# Patient Record
Sex: FEMALE | Race: WHITE | NOT HISPANIC OR LATINO | Employment: OTHER | URBAN - METROPOLITAN AREA
[De-identification: names, ages, dates, MRNs, and addresses within clinical notes are randomized per-mention and may not be internally consistent; named-entity substitution may affect disease eponyms.]

---

## 2021-06-27 ENCOUNTER — APPOINTMENT (OUTPATIENT)
Dept: GENERAL RADIOLOGY | Facility: CLINIC | Age: 81
End: 2021-06-27
Attending: NURSE PRACTITIONER
Payer: MEDICARE

## 2021-06-27 ENCOUNTER — HOSPITAL ENCOUNTER (EMERGENCY)
Facility: CLINIC | Age: 81
Discharge: HOME | End: 2021-06-27
Attending: NURSE PRACTITIONER | Admitting: NURSE PRACTITIONER
Payer: MEDICARE

## 2021-06-27 ENCOUNTER — APPOINTMENT (OUTPATIENT)
Dept: CT IMAGING | Facility: CLINIC | Age: 81
End: 2021-06-27
Attending: NURSE PRACTITIONER
Payer: MEDICARE

## 2021-06-27 VITALS
RESPIRATION RATE: 20 BRPM | WEIGHT: 245 LBS | DIASTOLIC BLOOD PRESSURE: 81 MMHG | BODY MASS INDEX: 34.66 KG/M2 | TEMPERATURE: 98.1 F | SYSTOLIC BLOOD PRESSURE: 174 MMHG | OXYGEN SATURATION: 94 % | HEART RATE: 89 BPM

## 2021-06-27 DIAGNOSIS — R53.83 FATIGUE: ICD-10-CM

## 2021-06-27 LAB
ALBUMIN SERPL-MCNC: 3.8 G/DL (ref 3.4–5)
ALBUMIN UR-MCNC: NEGATIVE MG/DL
ALP SERPL-CCNC: 94 U/L (ref 40–150)
ALT SERPL W P-5'-P-CCNC: 28 U/L (ref 0–50)
ANION GAP SERPL CALCULATED.3IONS-SCNC: 4 MMOL/L (ref 3–14)
APPEARANCE UR: CLEAR
AST SERPL W P-5'-P-CCNC: 19 U/L (ref 0–45)
BASOPHILS # BLD AUTO: 0 10E9/L (ref 0–0.2)
BASOPHILS NFR BLD AUTO: 0.5 %
BILIRUB SERPL-MCNC: 0.8 MG/DL (ref 0.2–1.3)
BILIRUB UR QL STRIP: NEGATIVE
BUN SERPL-MCNC: 17 MG/DL (ref 7–30)
CALCIUM SERPL-MCNC: 8.9 MG/DL (ref 8.5–10.1)
CHLORIDE SERPL-SCNC: 106 MMOL/L (ref 94–109)
CO2 SERPL-SCNC: 29 MMOL/L (ref 20–32)
COLOR UR AUTO: NORMAL
CREAT SERPL-MCNC: 0.87 MG/DL (ref 0.52–1.04)
DIFFERENTIAL METHOD BLD: ABNORMAL
EOSINOPHIL # BLD AUTO: 0.1 10E9/L (ref 0–0.7)
EOSINOPHIL NFR BLD AUTO: 1.5 %
ERYTHROCYTE [DISTWIDTH] IN BLOOD BY AUTOMATED COUNT: 13 % (ref 10–15)
GFR SERPL CREATININE-BSD FRML MDRD: 63 ML/MIN/{1.73_M2}
GLUCOSE BLDC GLUCOMTR-MCNC: 166 MG/DL (ref 70–99)
GLUCOSE SERPL-MCNC: 164 MG/DL (ref 70–99)
GLUCOSE UR STRIP-MCNC: NEGATIVE MG/DL
HCT VFR BLD AUTO: 40.6 % (ref 35–47)
HGB BLD-MCNC: 13.6 G/DL (ref 11.7–15.7)
HGB UR QL STRIP: NEGATIVE
IMM GRANULOCYTES # BLD: 0 10E9/L (ref 0–0.4)
IMM GRANULOCYTES NFR BLD: 0.3 %
KETONES UR STRIP-MCNC: NEGATIVE MG/DL
LACTATE BLD-SCNC: 0.8 MMOL/L (ref 0.7–2)
LEUKOCYTE ESTERASE UR QL STRIP: NEGATIVE
LYMPHOCYTES # BLD AUTO: 0.7 10E9/L (ref 0.8–5.3)
LYMPHOCYTES NFR BLD AUTO: 11.8 %
MAGNESIUM SERPL-MCNC: 1.9 MG/DL (ref 1.6–2.3)
MCH RBC QN AUTO: 30.6 PG (ref 26.5–33)
MCHC RBC AUTO-ENTMCNC: 33.5 G/DL (ref 31.5–36.5)
MCV RBC AUTO: 91 FL (ref 78–100)
MONOCYTES # BLD AUTO: 0.5 10E9/L (ref 0–1.3)
MONOCYTES NFR BLD AUTO: 8 %
NEUTROPHILS # BLD AUTO: 4.7 10E9/L (ref 1.6–8.3)
NEUTROPHILS NFR BLD AUTO: 77.9 %
NITRATE UR QL: NEGATIVE
NRBC # BLD AUTO: 0 10*3/UL
NRBC BLD AUTO-RTO: 0 /100
PH UR STRIP: 7 PH (ref 5–7)
PLATELET # BLD AUTO: 193 10E9/L (ref 150–450)
POTASSIUM SERPL-SCNC: 3.8 MMOL/L (ref 3.4–5.3)
PROT SERPL-MCNC: 7.4 G/DL (ref 6.8–8.8)
RBC # BLD AUTO: 4.45 10E12/L (ref 3.8–5.2)
SODIUM SERPL-SCNC: 139 MMOL/L (ref 133–144)
SOURCE: NORMAL
SP GR UR STRIP: 1 (ref 1–1.03)
TROPONIN I SERPL-MCNC: <0.015 UG/L (ref 0–0.04)
TROPONIN I SERPL-MCNC: <0.015 UG/L (ref 0–0.04)
UROBILINOGEN UR STRIP-MCNC: 0 MG/DL (ref 0–2)
WBC # BLD AUTO: 6 10E9/L (ref 4–11)

## 2021-06-27 PROCEDURE — 83605 ASSAY OF LACTIC ACID: CPT | Performed by: NURSE PRACTITIONER

## 2021-06-27 PROCEDURE — 70450 CT HEAD/BRAIN W/O DYE: CPT | Mod: ME

## 2021-06-27 PROCEDURE — 71045 X-RAY EXAM CHEST 1 VIEW: CPT

## 2021-06-27 PROCEDURE — 84484 ASSAY OF TROPONIN QUANT: CPT | Mod: 91 | Performed by: NURSE PRACTITIONER

## 2021-06-27 PROCEDURE — 96360 HYDRATION IV INFUSION INIT: CPT | Performed by: NURSE PRACTITIONER

## 2021-06-27 PROCEDURE — 99285 EMERGENCY DEPT VISIT HI MDM: CPT | Mod: 25 | Performed by: NURSE PRACTITIONER

## 2021-06-27 PROCEDURE — 85025 COMPLETE CBC W/AUTO DIFF WBC: CPT | Performed by: NURSE PRACTITIONER

## 2021-06-27 PROCEDURE — 258N000003 HC RX IP 258 OP 636: Performed by: NURSE PRACTITIONER

## 2021-06-27 PROCEDURE — 80053 COMPREHEN METABOLIC PANEL: CPT | Performed by: NURSE PRACTITIONER

## 2021-06-27 PROCEDURE — 83735 ASSAY OF MAGNESIUM: CPT | Performed by: NURSE PRACTITIONER

## 2021-06-27 PROCEDURE — 93005 ELECTROCARDIOGRAM TRACING: CPT | Performed by: NURSE PRACTITIONER

## 2021-06-27 PROCEDURE — 93010 ELECTROCARDIOGRAM REPORT: CPT | Performed by: NURSE PRACTITIONER

## 2021-06-27 PROCEDURE — 87040 BLOOD CULTURE FOR BACTERIA: CPT | Performed by: NURSE PRACTITIONER

## 2021-06-27 PROCEDURE — 999N001017 HC STATISTIC GLUCOSE BY METER IP

## 2021-06-27 PROCEDURE — 81003 URINALYSIS AUTO W/O SCOPE: CPT | Performed by: NURSE PRACTITIONER

## 2021-06-27 PROCEDURE — 250N000013 HC RX MED GY IP 250 OP 250 PS 637: Performed by: NURSE PRACTITIONER

## 2021-06-27 RX ORDER — SODIUM CHLORIDE 9 MG/ML
INJECTION, SOLUTION INTRAVENOUS CONTINUOUS
Status: DISCONTINUED | OUTPATIENT
Start: 2021-06-27 | End: 2021-06-27 | Stop reason: HOSPADM

## 2021-06-27 RX ORDER — BUPROPION HYDROCHLORIDE 100 MG/1
100 TABLET, EXTENDED RELEASE ORAL 2 TIMES DAILY
Qty: 30 TABLET | Refills: 0 | Status: SHIPPED | OUTPATIENT
Start: 2021-06-27

## 2021-06-27 RX ORDER — PAROXETINE 40 MG/1
40 TABLET, FILM COATED ORAL EVERY MORNING
Qty: 30 TABLET | Refills: 0 | Status: SHIPPED | OUTPATIENT
Start: 2021-06-27

## 2021-06-27 RX ORDER — OXYCODONE AND ACETAMINOPHEN 5; 325 MG/1; MG/1
1 TABLET ORAL ONCE
Status: COMPLETED | OUTPATIENT
Start: 2021-06-27 | End: 2021-06-27

## 2021-06-27 RX ADMIN — SODIUM CHLORIDE 1000 ML: 9 INJECTION, SOLUTION INTRAVENOUS at 17:56

## 2021-06-27 RX ADMIN — OXYCODONE HYDROCHLORIDE AND ACETAMINOPHEN 1 TABLET: 5; 325 TABLET ORAL at 19:28

## 2021-06-27 ASSESSMENT — ENCOUNTER SYMPTOMS
COUGH: 0
CARDIOVASCULAR NEGATIVE: 1
ABDOMINAL DISTENTION: 0
DIZZINESS: 0
SHORTNESS OF BREATH: 0
TROUBLE SWALLOWING: 0
CHEST TIGHTNESS: 0
DIAPHORESIS: 0
EYE REDNESS: 0
FATIGUE: 1
SLEEP DISTURBANCE: 0
LIGHT-HEADEDNESS: 0
BRUISES/BLEEDS EASILY: 1
ARTHRALGIAS: 1

## 2021-06-27 NOTE — ED TRIAGE NOTES
"Pt stated \"I woke up this morning and didn't feel well. I have been feeling more tired and weak. I also had high blood sugar this morning (170s).\"  "

## 2021-06-27 NOTE — ED PROVIDER NOTES
"Triage Note  1622 Pt stated \"I woke up this morning and didn't feel well. I have been feeling more tired and weak. I also had high blood sugar this morning (170s).\"       History     Chief Complaint   Patient presents with     Generalized Weakness     Hyperglycemia     HPI  Bianca Pandey is a 80 year old female who is visiting her brother from [place] with history of breast cancer whom completed her course of tamoxifen, atrial fib on Xarelto and metoprolol, history of pulmonary emboli, history of hypertension and hyperglycemia on insulin presenting to the emergency department with generalized malaise or fatigue, chills, suprapubic pressure onset today.  She reports they have been doing a lot of activities over the past week and generally if she is extremely active she needs to take 1 day of rest and she can sleep for about 12 hours.  She reports when she awoke this morning she did not feel well and such went back to bed and awoke at 3 PM today and when she awoke she just felt chilled, weak, and not well.  She reports she generally feels this way when she has a urinary tract infection.    She reports her blood sugar this morning was 170 which is above her normal.  She denies nausea, vomiting, diarrhea, severe abdominal pain, no flank pain, no hematuria, no strokelike symptoms such as slurred speech, facial droop, extremity weakness, paresthesias.    PCP; No Ref-Primary, Physician     Allergies:  Allergies   Allergen Reactions     Benadryl Allergy      Morphine Sulfate      Versed        Problem List:    Patient Active Problem List    Diagnosis Date Noted     Pulmonary embolism on right (H) 07/16/2012     Priority: Medium     Back pain 07/16/2012     Priority: Medium     Nausea 07/16/2012     Priority: Medium     AF (paroxysmal atrial fibrillation) (H) 07/16/2012     Priority: Medium     Breast cancer (H) 07/16/2012     Priority: Medium        Past Medical History:    Past Medical History:   Diagnosis Date     " Pulmonary embolism (H) 07/04/12       Past Surgical History:    History reviewed. No pertinent surgical history.    Family History:    No family history on file.    Social History:  Marital Status:   [2]  Social History     Tobacco Use     Smoking status: Former Smoker   Substance Use Topics     Alcohol use: None     Drug use: None        Medications:    buPROPion (WELLBUTRIN SR) 100 MG 12 hr tablet  PARoxetine (PAXIL) 40 MG tablet  rivaroxaban ANTICOAGULANT (XARELTO ANTICOAGULANT) 20 MG TABS tablet  Amlodipine-Valsartan-HCTZ (EXFORGE HCT) 5-160-25 MG TABS  levothyroxine (SYNTHROID, LEVOTHROID) 150 MCG tablet  metFORMIN HCl 1000 MG (OSM) 24 hr tablet  metoprolol (LOPRESSOR) 50 MG tablet  omeprazole (PRILOSEC) 40 MG capsule  ondansetron (ZOFRAN) 4 MG tablet  oxyCODONE-acetaminophen (PERCOCET) 5-325 MG per tablet  tamoxifen (NOLVADEX) 20 MG tablet          Review of Systems   Constitutional: Positive for fatigue. Negative for diaphoresis.   HENT: Positive for congestion. Negative for trouble swallowing.         Seasonal allergies   Eyes: Negative for redness and visual disturbance.   Respiratory: Negative for cough, chest tightness and shortness of breath.    Cardiovascular: Negative.    Gastrointestinal: Negative for abdominal distention.   Endocrine: Positive for cold intolerance.   Musculoskeletal: Positive for arthralgias.   Skin: Negative for pallor.   Allergic/Immunologic: Positive for immunocompromised state.        Diabetic and age   Neurological: Negative for dizziness, syncope and light-headedness.   Hematological: Bruises/bleeds easily.   Psychiatric/Behavioral: Negative for sleep disturbance.       Physical Exam   BP: 136/78  Pulse: 70  Temp: 98.1  F (36.7  C)  Resp: 18  Weight: 111.1 kg (245 lb)  SpO2: 98 %      Physical Exam  Vitals signs and nursing note reviewed.   Constitutional:       Appearance: Normal appearance.      Comments: .  She does not feel well but is not toxic in appearance   HENT:       Head: Normocephalic and atraumatic.      Jaw: There is normal jaw occlusion.      Nose: Nose normal.      Mouth/Throat:      Lips: Pink.      Mouth: Mucous membranes are dry.      Tongue: Tongue does not deviate from midline.      Pharynx: Oropharynx is clear. Uvula midline.   Eyes:      Extraocular Movements: Extraocular movements intact.      Conjunctiva/sclera: Conjunctivae normal.      Pupils: Pupils are equal, round, and reactive to light.   Neck:      Musculoskeletal: Neck supple.   Cardiovascular:      Rate and Rhythm: Normal rate and regular rhythm.      Heart sounds: Normal heart sounds.   Pulmonary:      Effort: Pulmonary effort is normal.      Breath sounds: Normal breath sounds.   Abdominal:      General: Bowel sounds are normal. There is no distension.      Palpations: Abdomen is soft.      Tenderness: There is no abdominal tenderness.   Musculoskeletal:         General: No swelling.      Right lower leg: No edema.      Left lower leg: No edema.   Skin:     General: Skin is warm and dry.      Capillary Refill: Capillary refill takes less than 2 seconds.   Neurological:      General: No focal deficit present.      Mental Status: She is alert.   Psychiatric:         Mood and Affect: Mood normal.         Behavior: Behavior normal.         ED Course  80-year-old female presenting with generalized malaise onset this morning with mild suprapubic tenderness, increased fatigue, and chills reports she last felt this way when she had a UTI on exam she does appear that she does not feel well and she is then wrapped in many blankets as she reports she is feeling chilled.  I reviewed with the patient and her  I like to rule out sepsis and we will need to evaluate her urine and also get a chest x-ray.  Neurologically she does not appear to have any deficits.  I suspect less likely a stroke.  She has not had any chest pain her heart rate is 70 and regular.  In such we will proceed with a sepsis evaluation  we will give IV fluids await antibiotics until I have her urine and chest x-ray.  Patient verbalized understanding and amenable to plan of care.    1915 patient reports she is feeling slightly improved post IV fluid.  Her  reports she has been complaining of just feeling like her arms and legs are heavy for the past 2 days.  In such we will get a head CT as I do not have any of her causes for why she is feeling this way and we will repeat a second troponin as well.  Patient is requesting her pain medication as she is on Percocet 5 mg every 6 hours.  Patient and her  are amenable to plan of care.    2015 patient ambulated to the bathroom without difficulty.  She is feeling better post IV fluids.  I reviewed with the patient and her  I am not finding any acute causes for how she is feeling.  Patient reports at times she just wants to sleep all day.  I discussed with her and her  this could be underlying depression and can be related to some of her meds.  She does request a refill of her Xarelto Wellbutrin and paroxetine as she is running low and she is here in [place] for a few more weeks prior to going back to [place].  Sent these to her pharmacy of choice which is [facility] here in [place].  Patient discharged in stable condition with her  with instructions if anything worsening or acute emergent concerns return back to the ED.        Procedures               EKG Interpretation:      Interpreted by MILO Lagunas CNP  Time reviewed: 1739  Symptoms at time of EKG: fatigue  Rhythm: normal sinus   Rate: normal 72  Axis: normal  Ectopy: none  Conduction: first degree AV block  ST Segments/ T Waves: No ST-T wave changes  Q Waves: none  Comparison to prior: changed reviewed EKG from 6/19/2018 and report is only available:  sinus bradycardia 56 bpm first-degree AV block.    Clinical Impression: abnormal EKG      Results for orders placed or performed during the hospital  encounter of 06/27/21 (from the past 24 hour(s))   Glucose by meter   Result Value Ref Range    Glucose 166 (H) 70 - 99 mg/dL   CBC with platelets differential   Result Value Ref Range    WBC 6.0 4.0 - 11.0 10e9/L    RBC Count 4.45 3.8 - 5.2 10e12/L    Hemoglobin 13.6 11.7 - 15.7 g/dL    Hematocrit 40.6 35.0 - 47.0 %    MCV 91 78 - 100 fl    MCH 30.6 26.5 - 33.0 pg    MCHC 33.5 31.5 - 36.5 g/dL    RDW 13.0 10.0 - 15.0 %    Platelet Count 193 150 - 450 10e9/L    Diff Method Automated Method     % Neutrophils 77.9 %    % Lymphocytes 11.8 %    % Monocytes 8.0 %    % Eosinophils 1.5 %    % Basophils 0.5 %    % Immature Granulocytes 0.3 %    Nucleated RBCs 0 0 /100    Absolute Neutrophil 4.7 1.6 - 8.3 10e9/L    Absolute Lymphocytes 0.7 (L) 0.8 - 5.3 10e9/L    Absolute Monocytes 0.5 0.0 - 1.3 10e9/L    Absolute Eosinophils 0.1 0.0 - 0.7 10e9/L    Absolute Basophils 0.0 0.0 - 0.2 10e9/L    Abs Immature Granulocytes 0.0 0 - 0.4 10e9/L    Absolute Nucleated RBC 0.0    Comprehensive metabolic panel   Result Value Ref Range    Sodium 139 133 - 144 mmol/L    Potassium 3.8 3.4 - 5.3 mmol/L    Chloride 106 94 - 109 mmol/L    Carbon Dioxide 29 20 - 32 mmol/L    Anion Gap 4 3 - 14 mmol/L    Glucose 164 (H) 70 - 99 mg/dL    Urea Nitrogen 17 7 - 30 mg/dL    Creatinine 0.87 0.52 - 1.04 mg/dL    GFR Estimate 63 >60 mL/min/[1.73_m2]    GFR Estimate If Black 73 >60 mL/min/[1.73_m2]    Calcium 8.9 8.5 - 10.1 mg/dL    Bilirubin Total 0.8 0.2 - 1.3 mg/dL    Albumin 3.8 3.4 - 5.0 g/dL    Protein Total 7.4 6.8 - 8.8 g/dL    Alkaline Phosphatase 94 40 - 150 U/L    ALT 28 0 - 50 U/L    AST 19 0 - 45 U/L   Lactic acid whole blood   Result Value Ref Range    Lactic Acid 0.8 0.7 - 2.0 mmol/L   Troponin I   Result Value Ref Range    Troponin I ES <0.015 0.000 - 0.045 ug/L   Magnesium   Result Value Ref Range    Magnesium 1.9 1.6 - 2.3 mg/dL   XR Chest Port 1 View    Narrative    XR PORTABLE CHEST ONE VIEW   6/27/2021 6:19 PM     HISTORY:  Fatigue    COMPARISON: None.      Impression    IMPRESSION: Portable chest. Lungs are clear. Heart is normal in size.  No pneumothorax. No definite pleural effusions. Lower cervical fusion  plate is noted.    ANNIE GONZALEZ MD   UA reflex to Microscopic and Culture    Specimen: Unspecified Urine   Result Value Ref Range    Color Urine Straw     Appearance Urine Clear     Glucose Urine Negative NEG^Negative mg/dL    Bilirubin Urine Negative NEG^Negative    Ketones Urine Negative NEG^Negative mg/dL    Specific Gravity Urine 1.005 1.003 - 1.035    Blood Urine Negative NEG^Negative    pH Urine 7.0 5.0 - 7.0 pH    Protein Albumin Urine Negative NEG^Negative mg/dL    Urobilinogen mg/dL 0.0 0.0 - 2.0 mg/dL    Nitrite Urine Negative NEG^Negative    Leukocyte Esterase Urine Negative NEG^Negative    Source Unspecified Urine    Troponin I (now)   Result Value Ref Range    Troponin I ES <0.015 0.000 - 0.045 ug/L   CT Head w/o Contrast    Narrative    CT OF THE HEAD WITHOUT CONTRAST 6/27/2021 7:46 PM     COMPARISON: None available    HISTORY: Transient ischemic attack (TIA). Extremities feel weak for 2  days.    TECHNIQUE: 5 mm thick axial CT images of the head were acquired  without IV contrast material.    FINDINGS: There are postoperative changes consisting of a retromastoid  right occipital craniectomy. There is mild diffuse cerebral volume  loss. There are subtle patchy areas of decreased density in the  cerebral white matter bilaterally that are consistent with sequela of  chronic small vessel ischemic disease.    The ventricles and basal cisterns are within normal limits in  configuration given the degree of cerebral volume loss.  There is no  midline shift. There are no extra-axial fluid collections.    No intracranial hemorrhage, mass or recent infarct.    The visualized paranasal sinuses are well-aerated. There is no  mastoiditis. There are no fractures of the visualized bones.      Impression    IMPRESSION: Old  postoperative changes consisting of a retromastoid  right occipital craniectomy. Diffuse cerebral volume loss and cerebral  white matter changes consistent with chronic small vessel ischemic  disease. No evidence for acute intracranial pathology.        Radiation dose for this scan was reduced using automated exposure  control, adjustment of the mA and/or kV according to patient size, or  iterative reconstruction technique    ANGIE DIAZ MD       Medications   0.9% sodium chloride BOLUS (0 mLs Intravenous Stopped 6/27/21 1925)     Followed by   sodium chloride 0.9% infusion (has no administration in time range)   oxyCODONE-acetaminophen (PERCOCET) 5-325 MG per tablet 1 tablet (1 tablet Oral Given 6/27/21 1928)       Assessments & Plan (with Medical Decision Making)     I have reviewed the nursing notes.    I have reviewed the findings, diagnosis, plan and need for follow up with the patient.      New Prescriptions    BUPROPION (WELLBUTRIN SR) 100 MG 12 HR TABLET    Take 1 tablet (100 mg) by mouth 2 times daily    PAROXETINE (PAXIL) 40 MG TABLET    Take 1 tablet (40 mg) by mouth every morning    RIVAROXABAN ANTICOAGULANT (XARELTO ANTICOAGULANT) 20 MG TABS TABLET    Take 1 tablet (20 mg) by mouth daily (with dinner)       Final diagnoses:   Fatigue       6/27/2021   [facility] EMERGENCY DEPT     Jazmin Bethea APRN CNP  06/27/21 2017

## 2021-06-28 NOTE — DISCHARGE INSTRUCTIONS
Head CT and chest x-ray did not show any acute findings.  All of your labs were stable and no acute findings as well.  Heart enzyme was within normal limits.  Urinalysis did not show infection.    Continue to push fluids.  I have sent your medications that you need while you are here in [place] to the [facility] pharmacy here in [place].  Unfortunately I did not have your true dose of your Wellbutrin SR so I wrote for 100 mg tablets.  You may adjust this prescription accordingly to the dose you have been on.

## 2021-07-03 LAB
BACTERIA SPEC CULT: NO GROWTH
SPECIMEN SOURCE: NORMAL

## 2022-06-28 ENCOUNTER — HOSPITAL ENCOUNTER (EMERGENCY)
Facility: CLINIC | Age: 82
Discharge: HOME | End: 2022-06-28
Attending: EMERGENCY MEDICINE | Admitting: EMERGENCY MEDICINE
Payer: MEDICARE

## 2022-06-28 ENCOUNTER — APPOINTMENT (OUTPATIENT)
Dept: CT IMAGING | Facility: CLINIC | Age: 82
End: 2022-06-28
Attending: EMERGENCY MEDICINE
Payer: MEDICARE

## 2022-06-28 VITALS
HEART RATE: 73 BPM | WEIGHT: 255 LBS | DIASTOLIC BLOOD PRESSURE: 58 MMHG | TEMPERATURE: 98.3 F | BODY MASS INDEX: 36.07 KG/M2 | RESPIRATION RATE: 20 BRPM | SYSTOLIC BLOOD PRESSURE: 111 MMHG | OXYGEN SATURATION: 99 %

## 2022-06-28 DIAGNOSIS — R06.00 DYSPNEA, UNSPECIFIED TYPE: ICD-10-CM

## 2022-06-28 LAB
ANION GAP SERPL CALCULATED.3IONS-SCNC: 4 MMOL/L (ref 3–14)
BASOPHILS # BLD AUTO: 0 10E3/UL (ref 0–0.2)
BASOPHILS NFR BLD AUTO: 1 %
BUN SERPL-MCNC: 20 MG/DL (ref 7–30)
CALCIUM SERPL-MCNC: 8.6 MG/DL (ref 8.5–10.1)
CHLORIDE BLD-SCNC: 103 MMOL/L (ref 94–109)
CO2 SERPL-SCNC: 31 MMOL/L (ref 20–32)
CREAT BLD-MCNC: 1.1 MG/DL (ref 0.5–1)
CREAT SERPL-MCNC: 1.1 MG/DL (ref 0.52–1.04)
EOSINOPHIL # BLD AUTO: 0.2 10E3/UL (ref 0–0.7)
EOSINOPHIL NFR BLD AUTO: 3 %
ERYTHROCYTE [DISTWIDTH] IN BLOOD BY AUTOMATED COUNT: 12.7 % (ref 10–15)
GFR SERPL CREATININE-BSD FRML MDRD: 51 ML/MIN/1.73M2
GFR SERPL CREATININE-BSD FRML MDRD: 51 ML/MIN/1.73M2
GLUCOSE BLD-MCNC: 179 MG/DL (ref 70–99)
HCT VFR BLD AUTO: 37.1 % (ref 35–47)
HGB BLD-MCNC: 12.5 G/DL (ref 11.7–15.7)
IMM GRANULOCYTES # BLD: 0 10E3/UL
IMM GRANULOCYTES NFR BLD: 0 %
LYMPHOCYTES # BLD AUTO: 0.8 10E3/UL (ref 0.8–5.3)
LYMPHOCYTES NFR BLD AUTO: 11 %
MCH RBC QN AUTO: 30.9 PG (ref 26.5–33)
MCHC RBC AUTO-ENTMCNC: 33.7 G/DL (ref 31.5–36.5)
MCV RBC AUTO: 92 FL (ref 78–100)
MONOCYTES # BLD AUTO: 0.5 10E3/UL (ref 0–1.3)
MONOCYTES NFR BLD AUTO: 7 %
NEUTROPHILS # BLD AUTO: 5.3 10E3/UL (ref 1.6–8.3)
NEUTROPHILS NFR BLD AUTO: 78 %
NRBC # BLD AUTO: 0 10E3/UL
NRBC BLD AUTO-RTO: 0 /100
NT-PROBNP SERPL-MCNC: 482 PG/ML (ref 0–1800)
PLATELET # BLD AUTO: 166 10E3/UL (ref 150–450)
POTASSIUM BLD-SCNC: 4 MMOL/L (ref 3.4–5.3)
RBC # BLD AUTO: 4.05 10E6/UL (ref 3.8–5.2)
SODIUM SERPL-SCNC: 138 MMOL/L (ref 133–144)
TROPONIN I SERPL HS-MCNC: 9 NG/L
WBC # BLD AUTO: 6.8 10E3/UL (ref 4–11)

## 2022-06-28 PROCEDURE — 85025 COMPLETE CBC W/AUTO DIFF WBC: CPT | Performed by: EMERGENCY MEDICINE

## 2022-06-28 PROCEDURE — 36415 COLL VENOUS BLD VENIPUNCTURE: CPT | Performed by: EMERGENCY MEDICINE

## 2022-06-28 PROCEDURE — 99285 EMERGENCY DEPT VISIT HI MDM: CPT | Mod: 25 | Performed by: EMERGENCY MEDICINE

## 2022-06-28 PROCEDURE — G1010 CDSM STANSON: HCPCS

## 2022-06-28 PROCEDURE — 83880 ASSAY OF NATRIURETIC PEPTIDE: CPT | Performed by: EMERGENCY MEDICINE

## 2022-06-28 PROCEDURE — 93010 ELECTROCARDIOGRAM REPORT: CPT | Performed by: EMERGENCY MEDICINE

## 2022-06-28 PROCEDURE — 84484 ASSAY OF TROPONIN QUANT: CPT | Performed by: EMERGENCY MEDICINE

## 2022-06-28 PROCEDURE — 82565 ASSAY OF CREATININE: CPT | Mod: 91

## 2022-06-28 PROCEDURE — 93005 ELECTROCARDIOGRAM TRACING: CPT | Performed by: EMERGENCY MEDICINE

## 2022-06-28 PROCEDURE — 250N000011 HC RX IP 250 OP 636: Performed by: EMERGENCY MEDICINE

## 2022-06-28 PROCEDURE — 80048 BASIC METABOLIC PNL TOTAL CA: CPT | Performed by: EMERGENCY MEDICINE

## 2022-06-28 PROCEDURE — 250N000009 HC RX 250: Performed by: EMERGENCY MEDICINE

## 2022-06-28 RX ORDER — IOPAMIDOL 755 MG/ML
500 INJECTION, SOLUTION INTRAVASCULAR ONCE
Status: COMPLETED | OUTPATIENT
Start: 2022-06-28 | End: 2022-06-28

## 2022-06-28 RX ADMIN — IOPAMIDOL 74 ML: 755 INJECTION, SOLUTION INTRAVENOUS at 08:49

## 2022-06-28 RX ADMIN — SODIUM CHLORIDE 70 ML: 9 INJECTION, SOLUTION INTRAVENOUS at 08:48

## 2022-06-28 NOTE — DISCHARGE INSTRUCTIONS
Fortunately your CAT scan showed no blood clots or pneumonia.  You had some incidental findings of lung nodules.  Recommendation based on size is that this CT scan be repeated in 3 months to make sure that those are stable and not a malignancy or cancer.  Your blood tests were also reassuring.  No evidence for heart attack.  Your EKG looks good.  I think you are safe to travel today.  You can restart your Xarelto today.  It was a pleasure to meet you.

## 2022-06-28 NOTE — ED TRIAGE NOTES
Patient c/o short of breath and leg swelling. She is from [place] and forgot her Xarelto at home. She has been in [place] x2 weeks.     Triage Assessment     Row Name 06/28/22 0735       Triage Assessment (Adult)    Airway WDL WDL       Respiratory WDL    Respiratory WDL WDL

## 2022-06-28 NOTE — ED PROVIDER NOTES
History     Chief Complaint   Patient presents with     Shortness of Breath     HPI  Bianca Pandey is a 81 year old female who has a history of COPD, pulmonary embolism, atrial fibrillation and is normally anticoagulated with Xarelto noted that she has been more short of breath over the last week and has had bilateral ankle edema and has felt nauseated.  She has had a little bit of chest discomfort here and there and a little bit more shortness of breath with exertion.  She has not had calf pain or groin pain.  She uses oxygen at home and normally has about 3 to 4 L depending on her activities.  She has had an IVC filter placed in the past.  She is here from [place] and did not get her Xarelto accidentally and did not notice until she started becoming symptomatic.  No history of heart failure.  She is planning on being on a flight this afternoon to go back to [place].  She has not had a fever, cough, wheezing, headache, body aches, fatigue that is new.  No known contact with somebody with COVID at this time.  She was tested for COVID in the last couple weeks and was negative.    Allergies:  Allergies   Allergen Reactions     Benadryl Allergy      Morphine Sulfate      Versed        Problem List:    Patient Active Problem List    Diagnosis Date Noted     Pulmonary embolism on right (H) 07/16/2012     Priority: Medium     Back pain 07/16/2012     Priority: Medium     Nausea 07/16/2012     Priority: Medium     AF (paroxysmal atrial fibrillation) (H) 07/16/2012     Priority: Medium     Breast cancer (H) 07/16/2012     Priority: Medium        Past Medical History:    Past Medical History:   Diagnosis Date     Pulmonary embolism (H) 07/04/12       Past Surgical History:    No past surgical history on file.    Family History:    No family history on file.    Social History:  Marital Status:   [2]  Social History     Tobacco Use     Smoking status: Former Smoker        Medications:    levothyroxine (SYNTHROID,  LEVOTHROID) 150 MCG tablet  rivaroxaban ANTICOAGULANT (XARELTO ANTICOAGULANT) 20 MG TABS tablet  Amlodipine-Valsartan-HCTZ (EXFORGE HCT) 5-160-25 MG TABS  buPROPion (WELLBUTRIN SR) 100 MG 12 hr tablet  metFORMIN HCl 1000 MG (OSM) 24 hr tablet  metoprolol (LOPRESSOR) 50 MG tablet  omeprazole (PRILOSEC) 40 MG capsule  ondansetron (ZOFRAN) 4 MG tablet  oxyCODONE-acetaminophen (PERCOCET) 5-325 MG per tablet  PARoxetine (PAXIL) 40 MG tablet  tamoxifen (NOLVADEX) 20 MG tablet          Review of Systems   All other systems reviewed and are negative.      Physical Exam   BP: 121/74  Pulse: 68  Temp: 98.3  F (36.8  C)  Resp: 20  Weight: 115.7 kg (255 lb)  SpO2: 98 %      Physical Exam  Vitals and nursing note reviewed.   Constitutional:       General: She is not in acute distress.     Appearance: She is well-developed. She is not diaphoretic.   HENT:      Head: Normocephalic and atraumatic.   Eyes:      General: No scleral icterus.  Cardiovascular:      Rate and Rhythm: Normal rate and regular rhythm.   Pulmonary:      Breath sounds: No decreased breath sounds, wheezing, rhonchi or rales.   Musculoskeletal:      Cervical back: Normal range of motion and neck supple.      Right lower leg: Edema present.      Left lower leg: Edema present.      Comments: Bilateral ankle edema without any calf tenderness or asymmetry.  No significant pretibial edema.   Skin:     General: Skin is warm and dry.      Coloration: Skin is not pale.      Findings: No erythema or rash.   Neurological:      General: No focal deficit present.      Mental Status: She is alert and oriented to person, place, and time.   Psychiatric:         Mood and Affect: Mood normal.         Behavior: Behavior normal.         ED Course                 Procedures              EKG Interpretation:      Interpreted by Garry Hernadez MD  Time reviewed: 0812  Symptoms at time of EKG: shortness of breath  Rhythm: normal sinus   Rate: normal  Axis: normal  Ectopy:  none  Conduction: normal  ST Segments/ T Waves: No ST-T wave changes  Q Waves: none  Comparison to prior: Unchanged    Clinical Impression: normal EKG                 Results for orders placed or performed during the hospital encounter of 06/28/22 (from the past 24 hour(s))   CBC with platelets differential    Narrative    The following orders were created for panel order CBC with platelets differential.  Procedure                               Abnormality         Status                     ---------                               -----------         ------                     CBC with platelets and d...[[id number]]                      Final result                 Please view results for these tests on the individual orders.   Basic metabolic panel   Result Value Ref Range    Sodium 138 133 - 144 mmol/L    Potassium 4.0 3.4 - 5.3 mmol/L    Chloride 103 94 - 109 mmol/L    Carbon Dioxide (CO2) 31 20 - 32 mmol/L    Anion Gap 4 3 - 14 mmol/L    Urea Nitrogen 20 7 - 30 mg/dL    Creatinine 1.10 (H) 0.52 - 1.04 mg/dL    Calcium 8.6 8.5 - 10.1 mg/dL    Glucose 179 (H) 70 - 99 mg/dL    GFR Estimate 51 (L) >60 mL/min/1.73m2   Troponin I   Result Value Ref Range    Troponin I High Sensitivity 9 <54 ng/L   Nt probnp inpatient (BNP)   Result Value Ref Range    N terminal Pro BNP Inpatient 482 0 - 1,800 pg/mL   CBC with platelets and differential   Result Value Ref Range    WBC Count 6.8 4.0 - 11.0 10e3/uL    RBC Count 4.05 3.80 - 5.20 10e6/uL    Hemoglobin 12.5 11.7 - 15.7 g/dL    Hematocrit 37.1 35.0 - 47.0 %    MCV 92 78 - 100 fL    MCH 30.9 26.5 - 33.0 pg    MCHC 33.7 31.5 - 36.5 g/dL    RDW 12.7 10.0 - 15.0 %    Platelet Count 166 150 - 450 10e3/uL    % Neutrophils 78 %    % Lymphocytes 11 %    % Monocytes 7 %    % Eosinophils 3 %    % Basophils 1 %    % Immature Granulocytes 0 %    NRBCs per 100 WBC 0 <1 /100    Absolute Neutrophils 5.3 1.6 - 8.3 10e3/uL    Absolute Lymphocytes 0.8 0.8 - 5.3 10e3/uL    Absolute Monocytes 0.5  0.0 - 1.3 10e3/uL    Absolute Eosinophils 0.2 0.0 - 0.7 10e3/uL    Absolute Basophils 0.0 0.0 - 0.2 10e3/uL    Absolute Immature Granulocytes 0.0 <=0.4 10e3/uL    Absolute NRBCs 0.0 10e3/uL   Creatinine POCT   Result Value Ref Range    Creatinine POCT 1.1 (H) 0.5 - 1.0 mg/dL    GFR, ESTIMATED POCT 51 (L) >60 mL/min/1.73m2   CT Chest Pulmonary Embolism w Contrast    Narrative    CT CHEST PULMONARY EMBOLISM WITH CONTRAST June 28, 2022 9:02 AM    CLINICAL HISTORY: Female sex. Not pregnant. No imaging to rule out  pulmonary embolus in the last 24 hours. Pulmonary Embolism Rule-Out  Criteria (PERC) score > 0. Revised McLeod Score (RGS) >= 11. No  known/automatically detected potential contraindications to iodinated  contrast. Patient presents with shortness of breath and history of  COPD and pulmonary artery embolism. Patient has atrial fibrillation  and is usually anticoagulated with Xarelto. Patient also has some  chest discomfort but no calf or groin pain. Patient has not taken her  blood thinning medication for one week.    TECHNIQUE: CT angiogram chest during arterial phase injection IV  contrast. 2D and 3D MIP reconstructions were performed by the CT  technologist. Dose reduction techniques were used.   CONTRAST: 74 mL Isovue 370     COMPARISON: Chest x-rays dated 6/27/2021.    FINDINGS:  ANGIOGRAM CHEST: Pulmonary arteries are normal caliber and negative  for pulmonary emboli. Thoracic aorta is negative for dissection. No CT  evidence of right heart strain.    LUNGS AND PLEURA: Irregular nodule in the right lung apex (image 90  series 6) measures 1.1 x 0.6 cm cross-sectionally and 0.9 cm in  craniocaudal dimension. Well-circumscribed nodule in the anterolateral  right lower lung lobe (image 186 series 6) measures up to 0.7 cm.  Patchy densities in the right posterior costophrenic angle could  represent infiltrate versus atelectasis. Small nodule in the  anterolateral left lower lung lobe (image 210 series 6)  measures up to  0.5 cm. No other significant nodule or mass is seen in the lungs. No  pneumothorax or significant pleural fluid collection.    MEDIASTINUM/AXILLAE: The heart is enlarged. No mediastinal, hilar, or  axillary lymphadenopathy is identified. Thyroid is not well-seen.  There are aortic valve annular calcifications. Thoracic aorta  calcifications are also noted. Mild coronary artery calcification is  are suggested. Thoracic aorta is of normal caliber and demonstrates no  dissection.    UPPER ABDOMEN: There is nonaneurysmal atherosclerosis. The left lobe  of the liver is somewhat diminutive and may be mild intrahepatic  biliary duct dilatation. Visualized portions of the upper abdominal  contents are otherwise unremarkable.    MUSCULOSKELETAL: Multilevel degenerative changes are seen throughout  the spine. No aggressive osseous lesions or acute osseous fractures  are identified.      Impression    IMPRESSION:  1.  No evidence for pulmonary artery embolism is identified.  2.  Probable atelectasis versus infiltrate in the posterior aspects of  the right lung.  3.  Cardiomegaly. No effusions or overt pulmonary edema identified.  4.  Noncalcified pulmonary nodules measure up to just slightly greater  than 1 cm in diameter.     Recommendations for an incidental lung nodule > 8mm:    Low risk patients: Consider follow-up CT in 3 months, PET/CT, and/or  tissue sampling.    High risk patients: Same as for low risk patients.    *Low Risk: Minimal or absent history of smoking or other known risk  factors.  *Nonsolid (ground-glass) or partly solid nodules may require longer  follow-up to exclude indolent adenocarcinoma.  *Recommendations based on Guidelines for the Management of Incidental  Pulmonary Nodules Detected at CT: From the Fleischner Society 2017,  Radiology 2017.    I discussed these findings with Dr. Hernadez on 6/28/2022 at 9:48 AM.       Medications   iopamidol (ISOVUE-370) solution 500 mL (74 mLs  Intravenous Given 6/28/22 0849)   new 100 ml saline bag (70 mLs Intravenous Given 6/28/22 0848)       Assessments & Plan (with Medical Decision Making)  81-year-old with a history of pulmonary embolism and atrial fibrillation along with COPD who is oxygen dependent who presented to the emergency department with concern for pulmonary embolism given her history and the fact that she has not taken her Xarelto over the last week.  I discussed options with her and we decided to proceed with CT scan of the chest to rule out PE.  Revised [place] score is high and she cannot be ruled out using PERC criteria.  CT scan shows no pulmonary embolism.  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  I did inform her of the recommendation for repeat CT scan in 3 months.  She will be in [place] at the time.  She has a doctor down there as she can discuss that with her.  I gave her 14 tablets of Xarelto so that she could get restarted and half tablets until she can see her doctor.  There is no evidence for heart failure.  I would favor atelectasis as etiology for the findings on CT scan.  She does not have any symptoms concerning for pneumonia.  Patient was discharged home in stable condition.  All questions answered prior to discharge.     I have reviewed the nursing notes.    I have reviewed the findings, diagnosis, plan and need for follow up with the patient.      New Prescriptions    RIVAROXABAN ANTICOAGULANT (XARELTO ANTICOAGULANT) 20 MG TABS TABLET    Take 1 tablet (20 mg) by mouth daily (with dinner)       Final diagnoses:   Dyspnea, unspecified type       6/28/2022   [facility] EMERGENCY DEPT     Garry Hernadez MD  06/28/22 2277

## 2023-08-07 ENCOUNTER — OFFICE VISIT (OUTPATIENT)
Dept: URBAN - METROPOLITAN AREA CLINIC 18 | Facility: CLINIC | Age: 83
End: 2023-08-07
Payer: MEDICARE

## 2023-08-07 DIAGNOSIS — Z96.1 PRESENCE OF INTRAOCULAR LENS: ICD-10-CM

## 2023-08-07 DIAGNOSIS — E11.9 TYPE 2 DIABETES MELLITUS W/O COMPLICATION: Primary | ICD-10-CM

## 2023-08-07 DIAGNOSIS — H35.3132 NONEXUDATIVE MACULAR DEGENERATION, INTERMEDIATE DRY STAGE, BILATERAL: ICD-10-CM

## 2023-08-07 PROCEDURE — 99203 OFFICE O/P NEW LOW 30 MIN: CPT | Performed by: OPTOMETRIST

## 2023-08-07 ASSESSMENT — INTRAOCULAR PRESSURE
OS: 13
OD: 13

## 2024-07-10 ENCOUNTER — OFFICE VISIT (OUTPATIENT)
Dept: URBAN - METROPOLITAN AREA CLINIC 18 | Facility: CLINIC | Age: 84
End: 2024-07-10
Payer: MEDICARE

## 2024-07-10 DIAGNOSIS — H04.123 DRY EYE SYNDROME OF BILATERAL LACRIMAL GLANDS: ICD-10-CM

## 2024-07-10 DIAGNOSIS — H43.813 VITREOUS DEGENERATION, BILATERAL: ICD-10-CM

## 2024-07-10 DIAGNOSIS — H35.3134 NONEXUDATIVE AGE-RELATED MACULAR DEGENERATION, BILATERAL, ADVANCED ATROPHIC WITH SUBFOVEAL INVOLVEMENT: ICD-10-CM

## 2024-07-10 DIAGNOSIS — E11.9 TYPE 2 DIABETES MELLITUS W/O COMPLICATION: Primary | ICD-10-CM

## 2024-07-10 PROCEDURE — 92134 CPTRZ OPH DX IMG PST SGM RTA: CPT

## 2024-07-10 PROCEDURE — 99213 OFFICE O/P EST LOW 20 MIN: CPT

## 2024-07-10 ASSESSMENT — INTRAOCULAR PRESSURE
OS: 15
OD: 14

## 2025-04-14 ENCOUNTER — OFFICE VISIT (OUTPATIENT)
Dept: URBAN - METROPOLITAN AREA CLINIC 18 | Facility: CLINIC | Age: 85
End: 2025-04-14
Payer: MEDICARE

## 2025-04-14 DIAGNOSIS — E11.9 TYPE 2 DIABETES MELLITUS W/O COMPLICATION: Primary | ICD-10-CM

## 2025-04-14 DIAGNOSIS — H43.813 VITREOUS DEGENERATION, BILATERAL: ICD-10-CM

## 2025-04-14 DIAGNOSIS — H35.3134 NONEXUDATIVE AGE-RELATED MACULAR DEGENERATION, BILATERAL, ADVANCED ATROPHIC WITH SUBFOVEAL INVOLVEMENT: ICD-10-CM

## 2025-04-14 PROCEDURE — 92014 COMPRE OPH EXAM EST PT 1/>: CPT

## 2025-04-14 PROCEDURE — 92134 CPTRZ OPH DX IMG PST SGM RTA: CPT

## 2025-04-14 ASSESSMENT — INTRAOCULAR PRESSURE
OD: 13
OS: 13